# Patient Record
Sex: FEMALE | Race: WHITE | NOT HISPANIC OR LATINO | Employment: FULL TIME | URBAN - METROPOLITAN AREA
[De-identification: names, ages, dates, MRNs, and addresses within clinical notes are randomized per-mention and may not be internally consistent; named-entity substitution may affect disease eponyms.]

---

## 2024-11-07 ENCOUNTER — OFFICE VISIT (OUTPATIENT)
Dept: URGENT CARE | Age: 44
End: 2024-11-07
Payer: MEDICARE

## 2024-11-07 ENCOUNTER — ANCILLARY PROCEDURE (OUTPATIENT)
Dept: URGENT CARE | Age: 44
End: 2024-11-07
Payer: MEDICARE

## 2024-11-07 VITALS
DIASTOLIC BLOOD PRESSURE: 53 MMHG | TEMPERATURE: 97.3 F | RESPIRATION RATE: 16 BRPM | HEART RATE: 98 BPM | WEIGHT: 135 LBS | BODY MASS INDEX: 21.79 KG/M2 | SYSTOLIC BLOOD PRESSURE: 114 MMHG | OXYGEN SATURATION: 96 %

## 2024-11-07 DIAGNOSIS — J40 BRONCHITIS: ICD-10-CM

## 2024-11-07 DIAGNOSIS — R05.1 ACUTE COUGH: ICD-10-CM

## 2024-11-07 DIAGNOSIS — R05.1 ACUTE COUGH: Primary | ICD-10-CM

## 2024-11-07 PROCEDURE — 71046 X-RAY EXAM CHEST 2 VIEWS: CPT | Performed by: PHYSICIAN ASSISTANT

## 2024-11-07 RX ORDER — HYDROXYZINE HYDROCHLORIDE 50 MG/1
50 TABLET, FILM COATED ORAL
COMMUNITY

## 2024-11-07 RX ORDER — GABAPENTIN 100 MG/1
100 CAPSULE ORAL
COMMUNITY
Start: 2024-10-21 | End: 2024-12-20

## 2024-11-07 RX ORDER — RISPERIDONE 3 MG/1
TABLET ORAL
COMMUNITY
Start: 2024-09-28

## 2024-11-07 RX ORDER — PROPRANOLOL HYDROCHLORIDE 10 MG/1
5 TABLET ORAL
COMMUNITY
Start: 2024-10-08 | End: 2025-01-06

## 2024-11-07 RX ORDER — AZITHROMYCIN 250 MG/1
TABLET, FILM COATED ORAL
COMMUNITY
Start: 2024-11-01

## 2024-11-07 RX ORDER — BENZTROPINE MESYLATE 0.5 MG/1
0.5 TABLET ORAL
COMMUNITY
Start: 2024-10-08 | End: 2024-11-07

## 2024-11-07 RX ORDER — METHYLPREDNISOLONE 4 MG/1
TABLET ORAL
Qty: 21 TABLET | Refills: 0 | Status: SHIPPED | OUTPATIENT
Start: 2024-11-07

## 2024-11-07 RX ORDER — BENZONATATE 200 MG/1
200 CAPSULE ORAL 3 TIMES DAILY PRN
Qty: 20 CAPSULE | Refills: 0 | Status: SHIPPED | OUTPATIENT
Start: 2024-11-07 | End: 2024-11-14

## 2024-11-07 RX ORDER — ALBUTEROL SULFATE 90 UG/1
2 INHALANT RESPIRATORY (INHALATION) EVERY 4 HOURS PRN
Qty: 18 G | Refills: 0 | Status: SHIPPED | OUTPATIENT
Start: 2024-11-07 | End: 2025-11-07

## 2024-11-07 RX ORDER — CEFDINIR 300 MG/1
300 CAPSULE ORAL 2 TIMES DAILY
Qty: 20 CAPSULE | Refills: 0 | Status: SHIPPED | OUTPATIENT
Start: 2024-11-07 | End: 2024-11-17

## 2024-11-07 ASSESSMENT — ENCOUNTER SYMPTOMS
FATIGUE: 1
COUGH: 1
FEVER: 1

## 2024-11-07 NOTE — LETTER
November 7, 2024     Patient: Carisa Jo   YOB: 1980   Date of Visit: 11/7/2024       To Whom It May Concern:    It is my medical opinion that Carisa Jo  off work x 3 days due to illness .    If you have any questions or concerns, please don't hesitate to call.         Sincerely,        Elina Bernardo PA-C    CC: No Recipients

## 2024-11-08 NOTE — PROGRESS NOTES
Subjective   Patient ID: Carisa Jo is a 44 y.o. female. They present today with a chief complaint of Fever, Fatigue, Cough, and chest congestion.    History of Present Illness  Carisa is a 44 year old female presents to  with c/o cough, chest congestion, sinus drainage. Symptoms x 1 month. [facility] and [facility] with [facility]itis and placed on zithromax. She notes no improvement. Persistent cough and chest congestion. Hx of asthma, although notes it has been under good control for many years.       Fever   Associated symptoms include coughing.   Fatigue  Associated symptoms: cough, fatigue and fever    Cough  Associated symptoms include a fever.       Past Medical History  Allergies as of 11/07/2024 - Reviewed 11/07/2024   Allergen Reaction Noted    Penicillins Unknown and Rash 02/11/2020       (Not in a hospital admission)       Past Medical History:   Diagnosis Date    11 weeks gestation of pregnancy ([facility]) 12/22/2020    11 weeks gestation of pregnancy    16 weeks gestation of pregnancy ([facility]) 01/20/2021    16 weeks gestation of pregnancy    20 weeks gestation of pregnancy ([facility]) 02/17/2021    20 weeks gestation of pregnancy    24 weeks gestation of pregnancy ([facility]) 03/17/2021    24 weeks gestation of pregnancy    28 weeks gestation of pregnancy ([facility]) 04/14/2021    28 weeks gestation of pregnancy    32 weeks gestation of pregnancy ([facility]) 05/12/2021    32 weeks gestation of pregnancy    34 weeks gestation of pregnancy ([facility]) 05/26/2021    34 weeks gestation of pregnancy    35 weeks gestation of pregnancy ([facility]) 06/08/2021    35 weeks gestation of pregnancy    37 weeks gestation of pregnancy ([facility]) 06/16/2021    37 weeks gestation of pregnancy    38 weeks gestation of pregnancy ([facility]) 06/23/2021    38 weeks gestation of pregnancy    Acute upper respiratory infection, unspecified 02/14/2020    URI, acute    Allergic contact dermatitis due to plants, except  food 2020    Poison ivy    Unspecified asthma, uncomplicated ([facility]) 2017    Acute asthmatic bronchitis       Past Surgical History:   Procedure Laterality Date     SECTION, CLASSIC  10/25/2016     Section        reports that she has never smoked. She does not have any smokeless tobacco history on file. She reports that she does not use drugs.    Review of Systems  Review of Systems   Constitutional:  Positive for fatigue and fever.   Respiratory:  Positive for cough.                                   Objective    Vitals:    24 1857   BP: 114/53   Pulse: 98   Resp: 16   Temp: 36.3 °C (97.3 °F)   SpO2: 96%   Weight: 61.2 kg (135 lb)     No LMP recorded.    Physical Exam  Vitals and nursing note reviewed.   Constitutional:       General: She is not in acute distress.     Appearance: Normal appearance.   HENT:      Head: Normocephalic and atraumatic.      Right Ear: Tympanic membrane and ear canal normal.      Left Ear: Tympanic membrane and ear canal normal.      Nose: Congestion present. No rhinorrhea.      Mouth/Throat:      Mouth: Mucous membranes are moist.      Pharynx: No oropharyngeal exudate or posterior oropharyngeal erythema.   Eyes:      Extraocular Movements: Extraocular movements intact.      Conjunctiva/sclera: Conjunctivae normal.      Pupils: Pupils are equal, round, and reactive to light.   Cardiovascular:      Rate and Rhythm: Normal rate and regular rhythm.      Heart sounds: No murmur heard.  Pulmonary:      Effort: Pulmonary effort is normal.      Breath sounds: Wheezing present.   Skin:     General: Skin is warm and dry.   Neurological:      General: No focal deficit present.      Mental Status: She is alert and oriented to person, place, and time.   Psychiatric:         Mood and Affect: Mood normal.         Procedures    Point of Care Test & Imaging Results from this visit  No results found for this visit on 24.   No results found.    Diagnostic study  results (if any) were reviewed by Elina Bernardo PA-C.    Assessment/Plan   Allergies, medications, history, and pertinent labs/EKGs/Imaging reviewed by Elina Bernardo PA-C.     Medical Decision Making    Pt presents with cough and chest congestion x 1 month. CXR showing increased chilo hilar markings without focal consolidation pending final radiology review. Discussed symptoms and clinical presentation findings suggestive of an acute bronchitis likely secondary to viral URI. Advised continued close symptom monitoring and supportive treatment measures. Recommend OTC cough medication as needed for added symptom relief. Given the patient's duration of symptoms and lack of improvement with supportive treatment we agreed to initiate antimicrobial coverage and prescribed cefdinir, medrol, benzonatate, allbuterol. Pt with PCN rash, will observe closely for S/S allergic reaction.  Close follow up with PCP as needed.        Orders and Diagnoses  Diagnoses and all orders for this visit:  Acute cough  -     XR chest 2 views; Future  Bronchitis  -     cefdinir (Omnicef) 300 mg capsule; Take 1 capsule (300 mg) by mouth 2 times a day for 10 days.  -     methylPREDNISolone (Medrol Dospak) 4 mg tablets; Take as directed on package.  -     albuterol 90 mcg/actuation inhaler; Inhale 2 puffs every 4 hours if needed for wheezing.  -     benzonatate (Tessalon) 200 mg capsule; Take 1 capsule (200 mg) by mouth 3 times a day as needed for cough for up to 7 days. Do not crush or chew.      Medical Admin Record      Patient disposition: Home    Electronically signed by Elina Bernardo PA-C  7:47 PM